# Patient Record
Sex: FEMALE | NOT HISPANIC OR LATINO | Employment: OTHER | URBAN - METROPOLITAN AREA
[De-identification: names, ages, dates, MRNs, and addresses within clinical notes are randomized per-mention and may not be internally consistent; named-entity substitution may affect disease eponyms.]

---

## 2023-06-30 LAB
ANION GAP IN SER/PLAS: 12 MMOL/L (ref 10–20)
BASOPHILS (10*3/UL) IN BLOOD BY AUTOMATED COUNT: 0.02 X10E9/L (ref 0–0.1)
BASOPHILS/100 LEUKOCYTES IN BLOOD BY AUTOMATED COUNT: 0.3 % (ref 0–2)
CALCIUM (MG/DL) IN SER/PLAS: 9.5 MG/DL (ref 8.6–10.3)
CARBON DIOXIDE, TOTAL (MMOL/L) IN SER/PLAS: 31 MMOL/L (ref 21–32)
CHLORIDE (MMOL/L) IN SER/PLAS: 102 MMOL/L (ref 98–107)
CREATININE (MG/DL) IN SER/PLAS: 0.83 MG/DL (ref 0.5–1.05)
EOSINOPHILS (10*3/UL) IN BLOOD BY AUTOMATED COUNT: 0.11 X10E9/L (ref 0–0.4)
EOSINOPHILS/100 LEUKOCYTES IN BLOOD BY AUTOMATED COUNT: 1.5 % (ref 0–6)
ERYTHROCYTE DISTRIBUTION WIDTH (RATIO) BY AUTOMATED COUNT: 11.9 % (ref 11.5–14.5)
ERYTHROCYTE MEAN CORPUSCULAR HEMOGLOBIN CONCENTRATION (G/DL) BY AUTOMATED: 32.3 G/DL (ref 32–36)
ERYTHROCYTE MEAN CORPUSCULAR VOLUME (FL) BY AUTOMATED COUNT: 96 FL (ref 80–100)
ERYTHROCYTES (10*6/UL) IN BLOOD BY AUTOMATED COUNT: 4.54 X10E12/L (ref 4–5.2)
GFR FEMALE: 75 ML/MIN/1.73M2
GLUCOSE (MG/DL) IN SER/PLAS: 73 MG/DL (ref 74–99)
HEMATOCRIT (%) IN BLOOD BY AUTOMATED COUNT: 43.6 % (ref 36–46)
HEMOGLOBIN (G/DL) IN BLOOD: 14.1 G/DL (ref 12–16)
IMMATURE GRANULOCYTES/100 LEUKOCYTES IN BLOOD BY AUTOMATED COUNT: 0.3 % (ref 0–0.9)
LEUKOCYTES (10*3/UL) IN BLOOD BY AUTOMATED COUNT: 7.5 X10E9/L (ref 4.4–11.3)
LYMPHOCYTES (10*3/UL) IN BLOOD BY AUTOMATED COUNT: 2.01 X10E9/L (ref 0.8–3)
LYMPHOCYTES/100 LEUKOCYTES IN BLOOD BY AUTOMATED COUNT: 26.7 % (ref 13–44)
MONOCYTES (10*3/UL) IN BLOOD BY AUTOMATED COUNT: 0.63 X10E9/L (ref 0.05–0.8)
MONOCYTES/100 LEUKOCYTES IN BLOOD BY AUTOMATED COUNT: 8.4 % (ref 2–10)
NEUTROPHILS (10*3/UL) IN BLOOD BY AUTOMATED COUNT: 4.75 X10E9/L (ref 1.6–5.5)
NEUTROPHILS/100 LEUKOCYTES IN BLOOD BY AUTOMATED COUNT: 62.8 % (ref 40–80)
PLATELETS (10*3/UL) IN BLOOD AUTOMATED COUNT: 294 X10E9/L (ref 150–450)
POTASSIUM (MMOL/L) IN SER/PLAS: 4.3 MMOL/L (ref 3.5–5.3)
SODIUM (MMOL/L) IN SER/PLAS: 141 MMOL/L (ref 136–145)
UREA NITROGEN (MG/DL) IN SER/PLAS: 19 MG/DL (ref 6–23)

## 2023-07-02 LAB
STAPH/MRSA SCREEN, CULTURE: NORMAL
URINE CULTURE: ABNORMAL

## 2023-07-19 ENCOUNTER — HOSPITAL ENCOUNTER (OUTPATIENT)
Dept: DATA CONVERSION | Facility: HOSPITAL | Age: 73
End: 2023-07-19
Attending: OBSTETRICS & GYNECOLOGY | Admitting: OBSTETRICS & GYNECOLOGY
Payer: MEDICARE

## 2023-07-19 DIAGNOSIS — N99.3 PROLAPSE OF VAGINAL VAULT AFTER HYSTERECTOMY: ICD-10-CM

## 2023-07-19 DIAGNOSIS — N81.10 CYSTOCELE, UNSPECIFIED: ICD-10-CM

## 2023-07-28 LAB — URINE CULTURE: NORMAL

## 2023-09-29 VITALS — HEIGHT: 61 IN | BODY MASS INDEX: 19.6 KG/M2 | WEIGHT: 103.84 LBS

## 2023-09-30 NOTE — H&P
History & Physical Reviewed:   I have reviewed the History and Physical dated:  21-Jun-2023   History and Physical reviewed and relevant findings noted. Patient examined to review pertinent physical  findings.: No significant changes   Home Medications Reviewed: no changes noted   Allergies Reviewed: no changes noted       ERAS (Enhanced Recovery After Surgery):  ·  ERAS Patient: no     Consent:   COVID-19 Consent:  ·  COVID-19 Risk Consent Surgeon has reviewed key risks related to the risk of jayda COVID-19 and if they contract COVID-19 what the risks are.       Electronic Signatures:  Gracie Vale)  (Signed 19-Jul-2023 11:37)   Authored: History & Physical Reviewed, ERAS, Consent,  Note Completion      Last Updated: 19-Jul-2023 11:37 by Gracie Vale)
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
## 2023-11-28 ENCOUNTER — OFFICE VISIT (OUTPATIENT)
Dept: OBSTETRICS AND GYNECOLOGY | Facility: CLINIC | Age: 73
End: 2023-11-28
Payer: MEDICARE

## 2023-11-28 DIAGNOSIS — G89.29 OTHER CHRONIC BACK PAIN: ICD-10-CM

## 2023-11-28 DIAGNOSIS — M54.9 OTHER CHRONIC BACK PAIN: ICD-10-CM

## 2023-11-28 DIAGNOSIS — N81.4 UTEROVAGINAL PROLAPSE: Primary | ICD-10-CM

## 2023-11-28 PROCEDURE — 1126F AMNT PAIN NOTED NONE PRSNT: CPT | Performed by: NURSE PRACTITIONER

## 2023-11-28 PROCEDURE — 99213 OFFICE O/P EST LOW 20 MIN: CPT | Performed by: NURSE PRACTITIONER

## 2023-11-28 NOTE — PROGRESS NOTES
Wanda Catalan is a 73 y.o. female who complaints of back pain since having surgery    HPI  Record Review:  After having her second surgery, she has been having back pain since July. 7/19/2023 surgery: SSLF, PA, and cystoscopy with Dr. Vale  She was informed that this issue would resolve itself, but it has been four and a half months and she is still having pain.   She is seeing a chiropractor, a message therapist, and does stretches and is finding that nothing is helping her symptoms.   She explains that her therapist suggested that she tightens her stomach muscles when moving in bed at night, and that has given her some relief but not much.   Expresses concern about if she can lift her 20lb dog, and her hair has been thinning since the anesthesia  Vaginal Symptoms:  Denies the use of Estrogen cream   Sexual Activity:   Denies returning back to intercourse       General:   alert, appears stated age, and cooperative   Heart: regular rate and rhythm, S1, S2 normal, no murmur, click, rub or gallop   Lungs: clear to auscultation bilaterally   Abdomen: soft, non-tender, without masses or organomegaly   Vulva: normal   Vagina: Normal   Cervix: no lesions   Uterus: normal size   Adnexa: normal adnexa and no mass, fullness, tenderness   OBGyn Exam   No return of prolapse   Healing well   Atrophic, sutures present in vagina, removed     Assessment/Plan  Lower back pain    Plan  Continue current plan of care with PT and massage  Advised to use lubrication once she returns to intercourse  Can lift now   Reassured no return of prolapse     RTC in 2 - 3 months with  AURELIO Vance Megan Terrell, kaylene scribing for virtually, and in the presence of AURELIO Vance on 11/28/2023 at 2:57 PM.   Taylor ANDERSEN APRN-CNP, personally performed the services described in this documentation which was scribed virtually and I confirm that it is both accurate and complete.

## 2024-01-23 ENCOUNTER — OFFICE VISIT (OUTPATIENT)
Dept: OBSTETRICS AND GYNECOLOGY | Facility: CLINIC | Age: 74
End: 2024-01-23
Payer: MEDICARE

## 2024-01-23 ENCOUNTER — HOSPITAL ENCOUNTER (OUTPATIENT)
Dept: RADIOLOGY | Facility: EXTERNAL LOCATION | Age: 74
Discharge: HOME | End: 2024-01-23

## 2024-01-23 VITALS
WEIGHT: 107 LBS | HEIGHT: 61 IN | SYSTOLIC BLOOD PRESSURE: 135 MMHG | HEART RATE: 80 BPM | BODY MASS INDEX: 20.2 KG/M2 | DIASTOLIC BLOOD PRESSURE: 74 MMHG

## 2024-01-23 DIAGNOSIS — Z12.31 BREAST CANCER SCREENING BY MAMMOGRAM: Primary | ICD-10-CM

## 2024-01-23 DIAGNOSIS — Z12.31 BREAST CANCER SCREENING BY MAMMOGRAM: ICD-10-CM

## 2024-01-23 PROCEDURE — 1159F MED LIST DOCD IN RCRD: CPT | Performed by: NURSE PRACTITIONER

## 2024-01-23 PROCEDURE — 1126F AMNT PAIN NOTED NONE PRSNT: CPT | Performed by: NURSE PRACTITIONER

## 2024-01-23 PROCEDURE — 99212 OFFICE O/P EST SF 10 MIN: CPT | Performed by: NURSE PRACTITIONER

## 2024-01-23 PROCEDURE — 1036F TOBACCO NON-USER: CPT | Performed by: NURSE PRACTITIONER

## 2024-01-23 RX ORDER — MULTIVIT-MIN/IRON FUM/FOLIC AC 7.5 MG-4
TABLET ORAL
COMMUNITY

## 2024-01-23 ASSESSMENT — ENCOUNTER SYMPTOMS
RESPIRATORY NEGATIVE: 1
MUSCULOSKELETAL NEGATIVE: 1
GASTROINTESTINAL NEGATIVE: 1
NEUROLOGICAL NEGATIVE: 1
PSYCHIATRIC NEGATIVE: 1
CARDIOVASCULAR NEGATIVE: 1
CONSTITUTIONAL NEGATIVE: 1

## 2024-01-23 ASSESSMENT — PATIENT HEALTH QUESTIONNAIRE - PHQ9
1. LITTLE INTEREST OR PLEASURE IN DOING THINGS: NOT AT ALL
SUM OF ALL RESPONSES TO PHQ9 QUESTIONS 1 AND 2: 0
2. FEELING DOWN, DEPRESSED OR HOPELESS: NOT AT ALL

## 2024-01-23 ASSESSMENT — PAIN SCALES - GENERAL: PAINLEVEL: 0-NO PAIN

## 2024-01-24 NOTE — PROGRESS NOTES
Subjective   Patient ID: Wanda Catalan is a 73 y.o. female who presents for Annual.  HPI  Wanda is a patient who underwent surgery with Dr. Guevara on July 19, 2023, which included a sacrospinous ligament fixation, posterior repair, and cystoscopy. She has been participating in massage therapy and physical therapy postoperatively for recovery. Wanda presents today for her annual breast exam and to order a mammogram, as her last mammogram was performed exactly one year ago.    The patient reports no lingering pain following her surgery and is overall satisfied with the outcome. She mentions that her  has been patient and supportive throughout her recovery process.    Review of Systems   Constitutional: Negative.    HENT: Negative.     Respiratory: Negative.     Cardiovascular: Negative.    Gastrointestinal: Negative.    Genitourinary: Negative.    Musculoskeletal: Negative.    Neurological: Negative.    Psychiatric/Behavioral: Negative.         Objective   Weight: 48.5 kg (107 lb)  BMI: 20.22 kg/m²  BP: 135/74      Physical Exam  Constitutional:       Appearance: Normal appearance.   HENT:      Head: Normocephalic.      Right Ear: Tympanic membrane, ear canal and external ear normal.      Left Ear: Tympanic membrane, ear canal and external ear normal.      Nose: Nose normal.      Mouth/Throat:      Mouth: Mucous membranes are moist.      Pharynx: Oropharynx is clear.   Eyes:      Extraocular Movements: Extraocular movements intact.      Conjunctiva/sclera: Conjunctivae normal.      Pupils: Pupils are equal, round, and reactive to light.   Cardiovascular:      Rate and Rhythm: Normal rate and regular rhythm.      Pulses: Normal pulses.      Heart sounds: Normal heart sounds.   Pulmonary:      Effort: Pulmonary effort is normal.      Breath sounds: Normal breath sounds.   Abdominal:      General: Abdomen is flat. Bowel sounds are normal.      Palpations: Abdomen is soft.   Genitourinary:     General: Normal  vulva.      Rectum: Normal.   Musculoskeletal:         General: Normal range of motion.      Cervical back: Normal range of motion.   Neurological:      General: No focal deficit present.      Mental Status: She is alert and oriented to person, place, and time.   Psychiatric:         Mood and Affect: Mood normal.         Behavior: Behavior normal.         Thought Content: Thought content normal.         Judgment: Judgment normal.       Physical examination: Breast exam unremarkable, no abnormalities detected.    Assessment/Plan          1. Status post sacrospinous ligament surgery, posterior repair, and cystoscopy (July 19, 2023)     - Patient reports improvement in prolapse symptoms and no lingering pain after surgery     - Continue with massage therapy and physical therapy as needed     - Follow-up visit scheduled with Dr. Vale    2. Annual breast exam     - Breast exam unremarkable, no palpable masses or abnormalities detected     - Order mammogram as it has been one year since the last one     - Patient may go to [facility] or [facility] for mammogram     - Review mammogram results and follow up as needed    3. Pelvic exam     - Not performed during this visit     - Patient to see Dr. Gracie Vale for a pelvic exam during the scheduled follow-up visit     AURELIO Fernandez 01/23/24 2:15 PM  By signing my name below, IPenelope Scribe attest that this documentation has been prepared under the direction and in the presence of AURELIO Fernandez

## 2024-04-26 DIAGNOSIS — M79.641 HAND PAIN, RIGHT: Primary | ICD-10-CM

## 2024-04-29 ENCOUNTER — OFFICE VISIT (OUTPATIENT)
Dept: ORTHOPEDIC SURGERY | Facility: CLINIC | Age: 74
End: 2024-04-29
Payer: MEDICARE

## 2024-04-29 ENCOUNTER — HOSPITAL ENCOUNTER (OUTPATIENT)
Dept: RADIOLOGY | Facility: CLINIC | Age: 74
Discharge: HOME | End: 2024-04-29
Payer: MEDICARE

## 2024-04-29 VITALS — BODY MASS INDEX: 20.2 KG/M2 | HEIGHT: 61 IN | WEIGHT: 107 LBS

## 2024-04-29 DIAGNOSIS — M19.041 ARTHRITIS OF RIGHT HAND: Primary | ICD-10-CM

## 2024-04-29 DIAGNOSIS — M79.641 HAND PAIN, RIGHT: ICD-10-CM

## 2024-04-29 PROCEDURE — 99203 OFFICE O/P NEW LOW 30 MIN: CPT | Performed by: ORTHOPAEDIC SURGERY

## 2024-04-29 PROCEDURE — 1036F TOBACCO NON-USER: CPT | Performed by: ORTHOPAEDIC SURGERY

## 2024-04-29 PROCEDURE — 73130 X-RAY EXAM OF HAND: CPT | Mod: RT

## 2024-04-29 PROCEDURE — 73130 X-RAY EXAM OF HAND: CPT | Mod: RIGHT SIDE | Performed by: RADIOLOGY

## 2024-04-29 PROCEDURE — 1159F MED LIST DOCD IN RCRD: CPT | Performed by: ORTHOPAEDIC SURGERY

## 2024-04-29 ASSESSMENT — PAIN DESCRIPTION - DESCRIPTORS: DESCRIPTORS: ACHING;BURNING;TIGHTNESS

## 2024-04-29 ASSESSMENT — PAIN - FUNCTIONAL ASSESSMENT: PAIN_FUNCTIONAL_ASSESSMENT: 0-10

## 2024-04-29 NOTE — PROGRESS NOTES
Wanda is a 73 y.o.-year-old right hand dominant female presenting today for evaluation of long standing osteoarthritis and severe right hand pain.    This condition affects multiple joints, but it is her ring finger PIP joint that is the most symptomatic, followed by the DIP joints of her index, middle and small fingers. She is unable to take anti inflammatory medications, so she essentially treats this with topical analgesic creams like icy hot. She finds that the hand feels better when it is warm, but the effects of treatments like paraffin wax are very short lived. She thinks that her symptoms aggravated quickly after COVID vaccinations and is unhappy with the fact that she got COVID vaccination in the past. She also believes that she may have recently been exposed to COVID last week, and as a result, his apprehensive to consider any steroid injections today    Please refer to New Patient Intake Form scanned into patient's electronic record for self-reported past medical history, past surgical history, medications, allergies, family history, social history and 10 point review of systems.          Examination:     Constitutional: Oriented to person, place, and time.     Appears well-developed and well-nourished.     Head: Normocephalic and atraumatic.     Eyes: Pupils are equal, round, and reactive to light.     Cardiovascular: Intact distal pulses.     Pulmonary/Chest/Breast: Effort normal. No respiratory distress.     Neurological: Alert and oriented to person, place, and time.     Skin: Skin is warm and dry.     Psychiatric: normal mood and affect. Behavior is normal.     Musculoskeletal: Examination of the right hand reveals arthritic changes, predominantly involving the DIP joints of the index, middle and small finger, as well as the ring finger Pip joint. Thickening and and slight deformity of the ring finger PIP joint with apex radial deformity in the coronal plane, but maintains functional digital range  of motion. However, movement of these joints is associated with pain and she has tenderness to palpation.         X-rays of her right hand taken today demonstrate diffuse osteoarthritic changes with subtle deformity of the affected joints as mentioned previously.         Impression: Right hand arthritis          Plan: As we have discussed treatment options for this condition, general treatment strategies includes activity modifications and over the counter remedies and heat application which she has all tried. Steroid injections are an option most likely to be more tolerable and more effective for her PIP joint of the ring finger than the DIP joints. If the injections do not work, or not something that she is interested in, other treatment options include DIP joint arthrodesis and/or ring finger PIP joint implant arthroplasty. We have discussed what these would entail in general terms and what it means for surgical recovery. At this point, she is not ready to make any decisions regarding treatment options, and she wants to take some time to think about this and to potentially continue to recover from recent COVID infection. She will make an appointment in the future if she wants to try injection or further discuss surgical treatment options           Pranay Hair MD          [facility] [employer]     Department of Orthopaedic Surgery     Chief of Hand and Upper Extremity Surgery     [facility]     Scribe Attestation  By signing my name below, IFlor Scribe   attest that this documentation has been prepared under the direction and in the presence of Pranay Hair MD.

## 2024-06-28 ENCOUNTER — OFFICE VISIT (OUTPATIENT)
Dept: OBSTETRICS AND GYNECOLOGY | Facility: CLINIC | Age: 74
End: 2024-06-28
Payer: MEDICARE

## 2024-06-28 VITALS
WEIGHT: 106 LBS | DIASTOLIC BLOOD PRESSURE: 70 MMHG | SYSTOLIC BLOOD PRESSURE: 125 MMHG | HEIGHT: 61 IN | BODY MASS INDEX: 20.01 KG/M2

## 2024-06-28 DIAGNOSIS — Z90.710 HISTORY OF HYSTERECTOMY: Primary | ICD-10-CM

## 2024-06-28 PROCEDURE — 99213 OFFICE O/P EST LOW 20 MIN: CPT | Performed by: OBSTETRICS & GYNECOLOGY

## 2024-06-28 PROCEDURE — 1036F TOBACCO NON-USER: CPT | Performed by: OBSTETRICS & GYNECOLOGY

## 2024-06-28 PROCEDURE — 1126F AMNT PAIN NOTED NONE PRSNT: CPT | Performed by: OBSTETRICS & GYNECOLOGY

## 2024-06-28 ASSESSMENT — ENCOUNTER SYMPTOMS
ENDOCRINE NEGATIVE: 1
CARDIOVASCULAR NEGATIVE: 1
RESPIRATORY NEGATIVE: 1
EYES NEGATIVE: 1
PSYCHIATRIC NEGATIVE: 1
NEUROLOGICAL NEGATIVE: 1
MUSCULOSKELETAL NEGATIVE: 1
CONSTITUTIONAL NEGATIVE: 1

## 2024-06-28 ASSESSMENT — PAIN SCALES - GENERAL: PAINLEVEL: 0-NO PAIN
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
## 2025-07-01 ENCOUNTER — OFFICE VISIT (OUTPATIENT)
Dept: OBSTETRICS AND GYNECOLOGY | Facility: CLINIC | Age: 75
End: 2025-07-01
Payer: MEDICARE

## 2025-07-01 VITALS
HEART RATE: 76 BPM | WEIGHT: 107.2 LBS | DIASTOLIC BLOOD PRESSURE: 81 MMHG | BODY MASS INDEX: 20.24 KG/M2 | HEIGHT: 61 IN | SYSTOLIC BLOOD PRESSURE: 150 MMHG

## 2025-07-01 DIAGNOSIS — Z12.31 BREAST CANCER SCREENING BY MAMMOGRAM: Primary | ICD-10-CM

## 2025-07-01 DIAGNOSIS — Z90.710 HISTORY OF HYSTERECTOMY: ICD-10-CM

## 2025-07-01 PROCEDURE — 3008F BODY MASS INDEX DOCD: CPT | Performed by: NURSE PRACTITIONER

## 2025-07-01 PROCEDURE — 99212 OFFICE O/P EST SF 10 MIN: CPT | Performed by: NURSE PRACTITIONER

## 2025-07-01 PROCEDURE — 1159F MED LIST DOCD IN RCRD: CPT | Performed by: NURSE PRACTITIONER

## 2025-07-01 PROCEDURE — 1126F AMNT PAIN NOTED NONE PRSNT: CPT | Performed by: NURSE PRACTITIONER

## 2025-07-01 ASSESSMENT — ENCOUNTER SYMPTOMS
GASTROINTESTINAL NEGATIVE: 1
EYES NEGATIVE: 1
OCCASIONAL FEELINGS OF UNSTEADINESS: 0
ALLERGIC/IMMUNOLOGIC NEGATIVE: 1
CARDIOVASCULAR NEGATIVE: 1
NEUROLOGICAL NEGATIVE: 1
ENDOCRINE NEGATIVE: 1
CONSTITUTIONAL NEGATIVE: 1
DEPRESSION: 0
HEMATOLOGIC/LYMPHATIC NEGATIVE: 1
RESPIRATORY NEGATIVE: 1
LOSS OF SENSATION IN FEET: 0
PSYCHIATRIC NEGATIVE: 1
MUSCULOSKELETAL NEGATIVE: 1

## 2025-07-01 ASSESSMENT — PAIN SCALES - GENERAL: PAINLEVEL_OUTOF10: 0-NO PAIN

## 2025-07-01 NOTE — PROGRESS NOTES
Subjective   Patient ID: Wanda Catalan is a 75 y.o. female who presents for Annual Exam.    HPI  Reports no return of prolapse. Concerned that intercourse might cause a recurrence of prolapse. Experiences low libido, describes intercourse as a chore. Does not use tv estrogen. Denies vaginal pain, itching, or burning. She had cataract surgery in the past month. Requests mammogram order.     Records Review  - 7/2023 SSLF, [place], and cystoscopy for VVP with Dr. Vale.       Review of Systems   Constitutional: Negative.    HENT: Negative.     Eyes: Negative.    Respiratory: Negative.     Cardiovascular: Negative.    Gastrointestinal: Negative.    Endocrine: Negative.    Genitourinary: Negative.    Musculoskeletal: Negative.    Skin: Negative.    Allergic/Immunologic: Negative.    Neurological: Negative.    Hematological: Negative.    Psychiatric/Behavioral: Negative.         Objective   Physical Exam  Constitutional:       Appearance: Normal appearance.   HENT:      Head: Normocephalic and atraumatic.   Genitourinary:     General: Normal vulva.      Exam position: Lithotomy position.      Labia:         Right: No tenderness.         Left: No tenderness.       Vagina: Normal. No foreign body. No tenderness.      Uterus: Absent.       Comments: No return of prolapse. No abnormalities. Low estrogen noted. No foreign material palpated or visualized.   Neurological:      General: No focal deficit present.      Mental Status: She is alert and oriented to person, place, and time.   Psychiatric:         Mood and Affect: Mood normal.         Behavior: Behavior normal.         Thought Content: Thought content normal.         Judgment: Judgment normal.         Assessment/Plan   75 y.o. female with history of hysterectomy presents for breast cancer screening by mammogram.     Diagnosis List:  #1 History of hysterectomy  #2 Breast cancer screening by mammogram    Plan:  1. History of hysterectomy  - Pelvic exam performed;  unremarkable findings without return of prolapse.   - Follow up in Urogynecology.     2. Breast cancer screening by mammogram  - Placed requisition for her annual bilateral mammogram with tomosynthesis.     Follow up in 1 year with AURELIO Fernandez.      Scribe Attestation  By signing my name below, I, Lilliana Brennan, attest that this documentation has been prepared under the direction and in the presence of AURELIO Fernandez on 07/01/2025 at 1:48 PM.     SPEKE audio duration: 12 minutes    I spent a total of *** minutes in face to face and non face to face time.       AURELIO Fernandez 07/01/25 12:05 PM